# Patient Record
(demographics unavailable — no encounter records)

---

## 2024-11-02 NOTE — HISTORY OF PRESENT ILLNESS
[Pain is well-controlled] : pain is well-controlled [Fever] : no fever [Chills] : no chills [Nausea] : no nausea [Vomiting] : no vomiting [Clean/Dry/Intact] : clean, dry and intact [Erythema] : not erythematous [de-identified] : s/p TLH, b/l salpingectomy, cystoscopy, RAULITO on 9/11 presenting for post-op visit. Reports still having hot flashes on occasion, no other complaints. [de-identified] : Vaginal cuff intact on visualization and palpation

## 2024-11-02 NOTE — PLAN
[FreeTextEntry1] : s/p TLH, b/l salpingectomy, cystoscopy, RAULITO on 9/11 presenting for post-op visit, recovering well. - Reviewed no evidence of endometriosis, most recent Lupron should be wearing off around now. Advised if hot flashes persist for more than 3 month's post-op, she should discuss edmond-menopause with primary gyn as ovaries are still in situ - Reviewed no sex for 12 weeks post-op. Can return to all other normal activity - Can return to routine gyn care  Isrrael Rodrigues MD

## 2024-11-02 NOTE — CONSULT LETTER
[Dear  ___] : Dear  [unfilled], [Courtesy Letter:] : I had the pleasure of seeing your patient, [unfilled], in my office today. [Please see my note below.] : Please see my note below. [Consult Closing:] : Thank you very much for allowing me to participate in the care of this patient.  If you have any questions, please do not hesitate to contact me. [Sincerely,] : Sincerely, [FreeTextEntry1] : She underwent a successful TLH on 9/11 and is recovering well. She still has some hot flashes, which given she still has her ovaries, may be residual from her most recent Lupron dose. I advised she continue to monitor these symptoms for at least 3 months post-op, but if persistent afterwards, I advised she follow up with you to discuss possible edmond-menopause. Otherwise she is doing well and I am thus returning her to your excellent care. [FreeTextEntry3] : Isrrael Rodrigues MD

## 2024-11-02 NOTE — HISTORY OF PRESENT ILLNESS
DC Transport Scheduled    Received request at: 5/1/2023 at 1324    Transport Company Scheduled:  [employer]    Scheduled Date: 5/1/2023  Scheduled Time: 1545    Destination: Home at [address]     Notified care team of scheduled transport via [employer].     If there are any changes needed to the DC transportation scheduled, please contact [facility] at ext. [phone] between the hours of 5437-1833 Mon-Fri. If outside those hours, contact the ED Case Manager at ext. [phone].      [Pain is well-controlled] : pain is well-controlled [Fever] : no fever [Chills] : no chills [Nausea] : no nausea [Vomiting] : no vomiting [Clean/Dry/Intact] : clean, dry and intact [Erythema] : not erythematous [de-identified] : s/p TLH, b/l salpingectomy, cystoscopy, RAULITO on 9/11 presenting for post-op visit. Reports still having hot flashes on occasion, no other complaints. [de-identified] : Vaginal cuff intact on visualization and palpation